# Patient Record
Sex: FEMALE | Race: WHITE
[De-identification: names, ages, dates, MRNs, and addresses within clinical notes are randomized per-mention and may not be internally consistent; named-entity substitution may affect disease eponyms.]

---

## 2021-07-24 ENCOUNTER — HOSPITAL ENCOUNTER (OUTPATIENT)
Dept: HOSPITAL 47 - LABPAT | Age: 51
Discharge: HOME | End: 2021-07-24
Attending: OBSTETRICS & GYNECOLOGY
Payer: COMMERCIAL

## 2021-07-24 DIAGNOSIS — Z01.812: Primary | ICD-10-CM

## 2021-07-24 LAB
BASOPHILS # BLD AUTO: 0.1 K/UL (ref 0–0.2)
BASOPHILS NFR BLD AUTO: 1 %
EOSINOPHIL # BLD AUTO: 0.6 K/UL (ref 0–0.7)
EOSINOPHIL NFR BLD AUTO: 8 %
ERYTHROCYTE [DISTWIDTH] IN BLOOD BY AUTOMATED COUNT: 4.81 M/UL (ref 3.8–5.4)
ERYTHROCYTE [DISTWIDTH] IN BLOOD: 12.9 % (ref 11.5–15.5)
HCT VFR BLD AUTO: 45.5 % (ref 34–46)
HGB BLD-MCNC: 14.8 GM/DL (ref 11.4–16)
LYMPHOCYTES # SPEC AUTO: 2.3 K/UL (ref 1–4.8)
LYMPHOCYTES NFR SPEC AUTO: 31 %
MCH RBC QN AUTO: 30.8 PG (ref 25–35)
MCHC RBC AUTO-ENTMCNC: 32.6 G/DL (ref 31–37)
MCV RBC AUTO: 94.5 FL (ref 80–100)
MONOCYTES # BLD AUTO: 0.4 K/UL (ref 0–1)
MONOCYTES NFR BLD AUTO: 6 %
NEUTROPHILS # BLD AUTO: 3.9 K/UL (ref 1.3–7.7)
NEUTROPHILS NFR BLD AUTO: 53 %
PLATELET # BLD AUTO: 273 K/UL (ref 150–450)
WBC # BLD AUTO: 7.3 K/UL (ref 3.8–10.6)

## 2021-07-24 PROCEDURE — 36415 COLL VENOUS BLD VENIPUNCTURE: CPT

## 2021-07-24 PROCEDURE — 93005 ELECTROCARDIOGRAM TRACING: CPT

## 2021-07-24 PROCEDURE — 85025 COMPLETE CBC W/AUTO DIFF WBC: CPT

## 2021-07-30 NOTE — P.HPOB
History of Present Illness


H&P Date: 21


Chief Complaint: Postmenopausal bleeding





Susie is a 51-year-old female who has postmenopausal bleeding.  She is noted to 

have a thin endometrium, however she did have more episode of bleeding following

the initial episode and due to that we are proceeding with a D&C with hyst

eroscopy.  Risks/benefits/term's this procedure were reviewed with patient in 

detail and all questions were answered for her prior to proceeding to the 

operating room.





Past Medical History


Additional Past Medical History / Comment(s): Varicose veins


History of Any Multi-Drug Resistant Organisms: None Reported


Past Surgical History:  Section, Cholecystectomy, Tubal Ligation


Past Anesthesia/Blood Transfusion Reactions: No Reported Reaction


Smoking Status: Current every day smoker





- Past Family History


  ** Mother


Family Medical History: Cancer


Additional Family Medical History / Comment(s): Bladder cancer





Medications and Allergies


                                Home Medications











 Medication  Instructions  Recorded  Confirmed  Type


 


ALPRAZolam [Xanax] 0.5 mg PO DAILY PRN 21 History


 


Citalopram Hydrobromide [CeleXA] 10 mg PO Q2D PRN 21 History


 


Ibuprofen [Advil] 400 mg PO Q8HR PRN 21 History








                                    Allergies











Allergy/AdvReac Type Severity Reaction Status Date / Time


 


No Known Allergies Allergy   Verified 21 09:41














Exam


Osteopathic Statement: *.  No significant issues noted on an osteopathic 

structural exam other than those noted in the History and Physical/Consult.


                                Intake and Output











 21





 06:59 14:59 22:59


 


Other:   


 


  Weight  74.843 kg 














- OBG Physical Exam


Breast: both: normal (no masses)


Abdomen: bowel sounds normal, no diffuse tenderness, no bruit present, no 

guarding noted, no hepatomegaly, no splenomegaly, no mass


Vulva: both: normal


Vagina: normal moisture, no discharge


Cervix: no lesion, no discharge


Uterus: normal size, normal contour


Adnexa: both: normal


Anus/Rectum: normal perianal skin, no rectal mass, no hemorrhoids, heme negative

## 2021-08-02 ENCOUNTER — HOSPITAL ENCOUNTER (OUTPATIENT)
Dept: HOSPITAL 47 - OR | Age: 51
Discharge: HOME | End: 2021-08-02
Attending: OBSTETRICS & GYNECOLOGY
Payer: COMMERCIAL

## 2021-08-02 VITALS — DIASTOLIC BLOOD PRESSURE: 56 MMHG | HEART RATE: 56 BPM | SYSTOLIC BLOOD PRESSURE: 96 MMHG

## 2021-08-02 VITALS — TEMPERATURE: 97.3 F

## 2021-08-02 VITALS — RESPIRATION RATE: 16 BRPM

## 2021-08-02 VITALS — BODY MASS INDEX: 24.3 KG/M2

## 2021-08-02 DIAGNOSIS — N95.0: Primary | ICD-10-CM

## 2021-08-02 DIAGNOSIS — Z80.52: ICD-10-CM

## 2021-08-02 DIAGNOSIS — F17.200: ICD-10-CM

## 2021-08-02 PROCEDURE — 81025 URINE PREGNANCY TEST: CPT

## 2021-08-02 PROCEDURE — 88305 TISSUE EXAM BY PATHOLOGIST: CPT

## 2021-08-02 PROCEDURE — 58558 HYSTEROSCOPY BIOPSY: CPT

## 2021-08-02 NOTE — P.OP
Date of Procedure: 08/02/21


Preoperative Diagnosis: 


Postmenopausal bleeding


Postoperative Diagnosis: 


Same


Procedure(s) Performed: 


D&C with hysteroscopy


Anesthesia: JARROD


Surgeon: Fransisco Hogue


Estimated Blood Loss (ml): 1


IV fluids (ml): 300


Pathology: other (Uterine curettings)


Condition: stable


Disposition: same day


Operative Findings: 


Pathology pending


Description of Procedure: 


Patient was taken to the operating suite where a general anesthetic was found be

adequate.  She was prepped and draped in the normal sterile fashion and placed 

in dorsal lithotomy position.  Initially a weighted speculum was inserted in the

vagina and anterior lip of the cervix identified grasped with an Allis clamp.  

Cervix was then dilated and sounded to 7 cm.  Camera was then inserted.  Limited

pathologies noted therefore camera was removed and sharp curettings of end

ometrium were obtained.  All tissues collected, placed on Telfa, and placenta 

pathology for evaluation.  Once completed, incidents removed.  Sponge, lap, 

needle counts were all correct 2.  Patient was then taken to the recovery room 

in stable and satisfactory condition.





Plan - Discharge Summary


Discharge Rx Participant: Yes


New Discharge Prescriptions: 


No Action


   Ibuprofen [Advil] 400 mg PO Q8HR PRN


     PRN Reason: Pain


   ALPRAZolam [Xanax] 0.5 mg PO DAILY PRN


     PRN Reason: Anxiety


   Citalopram Hydrobromide [CeleXA] 10 mg PO Q2D PRN


     PRN Reason: Anxiety


Discharge Medication List





ALPRAZolam [Xanax] 0.5 mg PO DAILY PRN 07/30/21 [History]


Citalopram Hydrobromide [CeleXA] 10 mg PO Q2D PRN 07/30/21 [History]


Ibuprofen [Advil] 400 mg PO Q8HR PRN 07/30/21 [History]

## 2024-12-17 ENCOUNTER — HOSPITAL ENCOUNTER (OUTPATIENT)
Dept: HOSPITAL 47 - RADMAMWWP | Age: 54
Discharge: HOME | End: 2024-12-17
Attending: OBSTETRICS & GYNECOLOGY
Payer: COMMERCIAL

## 2024-12-17 DIAGNOSIS — Z80.3: ICD-10-CM

## 2024-12-17 DIAGNOSIS — M81.8: ICD-10-CM

## 2024-12-17 DIAGNOSIS — R92.323: ICD-10-CM

## 2024-12-17 DIAGNOSIS — Z12.31: Primary | ICD-10-CM

## 2024-12-17 DIAGNOSIS — Z78.0: ICD-10-CM

## 2024-12-17 PROCEDURE — 77063 BREAST TOMOSYNTHESIS BI: CPT

## 2024-12-17 PROCEDURE — 77067 SCR MAMMO BI INCL CAD: CPT

## 2024-12-17 PROCEDURE — 77080 DXA BONE DENSITY AXIAL: CPT

## 2024-12-17 NOTE — BD
EXAMINATION TYPE: Axial Bone Density

 

DATE OF EXAM: 12/17/2024

 

CLINICAL HISTORY: 54 years old Female.  ICD-10 CODE: N95.1 MENOPAUSAL STATE , Additional History:

 

Height:  5 ft 8 in

Weight:  162

 

FRAX RISK QUESTIONS:

 

Alcohol (3 or more units per day):  no

Family History (Parent hip fracture):  no

Glucocorticoids (More than 3mos):  no

           (Ex: prednisone, prednisolone, methylprednisolone, dexamethasone, and hydrocortisone).    
     

History of Fracture in Adulthood: yes

Secondary Osteoporosis:

  1.  Type 1 Diabetes: no

  2.  Hyperthyroidism: no

  3.  Menopause before 45: no

  4.  Malnutrition: no

  5.  Chronic liver disease: no

Rheumatoid Arthritis: no

Current Tobacco Use: yes

 

RISK FACTORS 

 

HISTORY OF: 

Surgery to Spine/Hip(right/left)/Wrist (right/left): no

 

MEDICATIONS: 

Thyroid Medications:  none

 

Osteoporosis Medications: none now/ just stopped in june

 

EXAM MEASUREMENTS: 

Bone mineral densitometry was performed using the [employer] System.

Bone mineral density as measured about the Lumbar spine is:  

----- L1-L4(G/cm2): 0.860

T Score Values are as follows:

----- L1: -1.8

----- L2: -3.2

----- L3: -2.9

----- L4: -2.8

----- L1-L4: -2.7

 

Z Score Values are as follows:

----- L1: -1.3

----- L2: -2.8

----- L3: -2.4

----- L4: -2.3

----- L1-L4: -2.2

 

prev done [facility]

 

Bone mineral density about the R hip (g/cm2): 0.794

Bone mineral density about the L hip (g/cm2): 0.771

T Score values are as follows:

-----R Neck: -1.8

-----L Neck: -1.9

-----R Total: -1.8

-----L Total: -1.8

 

Z Score values are as follows:

-----R Neck: -0.9

-----L Neck: -1.1

-----R Total: -1.4

-----L Total: -1.3

 

prev done at [facility]

 

 

FRAX%s: The graph provided illustrates a 13.9 % chance for a major osteoporotic fx and a 3.0 % chance
 for the hips probability for fx in 10 years time.

 

 

 

 

IMPRESSION:

Osteoporosis (T Score less than -2.5).

 

There is increased fracture risk and therapy is usually indicated based on age.

 

Re-Screen 1-2 years.

 

NOTE:  T-SCORE=SD OF THE YOUNG ADULT MEAN.

 

 

 

 

 

 

[facility], Workstation: [id number], 12/17/2024 8:24 AM

## 2024-12-18 NOTE — MM
Reason for Exam: Screening  (asymptomatic). 

Last mammogram was performed 1 year(s) and 4 month(s) ago. 





Patient History: 

Menarche at age 13. First Full-Term Pregnancy at age 20. Postmenopausal. Previous chest radiation

therapy.

Maternal aunt had breast cancer, age 70. 





Risk Values: 

Ciarra 5 year model risk: 1.0%.

NCI Lifetime model risk: 7.5%.





Prior Study Comparison: 

7/28/2022 Bilateral Screening Mammogram, [facility]. 8/22/2023 Bilateral Screening

Mammogram, [facility]. 





Tissue Density: 

There are scattered areas of fibroglandular density.





Findings: 

Analyzed By CAD. 

Right breast: There is no suspicious group of microcalcifications or new suspicious mass.

Stable decrease in size right breast lesions.



Left breast: There is no suspicious group of microcalcifications or new suspicious mass. 





Overall Assessment: Benign, BI-RAD 2





Management: 

Screening Mammogram of both breasts in 1 year.

[facility] will attempt to contact patient to return for supplemental views and

ultrasound if indicated.



Patient should continue monthly self-breast exams.  A clinical breast exam by your physician is

recommended on an annual basis.

This exam should not preclude additional follow-up of suspicious palpable abnormalities.



Note on Ciarra scores and lifetime risk:

1. A Ciarra score greater than 3% is considered moderate risk. If this is the case, consider

specialist referral to assess eligibility for a risk reducing agent.

2. If overall lifetime risk for the development of breast cancer is 20% or higher, the patient may

qualify for future screening with alternating mammogram and breast MRI.



[facility], Workstation: [id number], 12/18/2024 12:08 PM.



Electronically signed and approved by: Mateusz Snyder DO